# Patient Record
Sex: FEMALE | Race: WHITE | Employment: UNEMPLOYED | URBAN - METROPOLITAN AREA
[De-identification: names, ages, dates, MRNs, and addresses within clinical notes are randomized per-mention and may not be internally consistent; named-entity substitution may affect disease eponyms.]

---

## 2017-11-29 ENCOUNTER — OFFICE VISIT (OUTPATIENT)
Dept: NEUROLOGY | Facility: CLINIC | Age: 67
End: 2017-11-29
Payer: COMMERCIAL

## 2017-11-29 VITALS
WEIGHT: 110.5 LBS | BODY MASS INDEX: 20.86 KG/M2 | HEART RATE: 66 BPM | SYSTOLIC BLOOD PRESSURE: 188 MMHG | DIASTOLIC BLOOD PRESSURE: 81 MMHG | HEIGHT: 61 IN | OXYGEN SATURATION: 99 %

## 2017-11-29 DIAGNOSIS — G70.00 MYASTHENIA GRAVIS, ACHR ANTIBODY POSITIVE (H): ICD-10-CM

## 2017-11-29 PROCEDURE — 99214 OFFICE O/P EST MOD 30 MIN: CPT | Performed by: PSYCHIATRY & NEUROLOGY

## 2017-11-29 RX ORDER — PYRIDOSTIGMINE BROMIDE 60 MG/1
60 TABLET ORAL 3 TIMES DAILY
Qty: 90 TABLET | Refills: 11 | Status: SHIPPED | OUTPATIENT
Start: 2017-11-29 | End: 2018-10-18

## 2017-11-29 ASSESSMENT — PAIN SCALES - GENERAL: PAINLEVEL: NO PAIN (0)

## 2017-11-29 NOTE — LETTER
11/29/2017        RE: Zabrina Schmidt  [address] [facility] [address]        NEUROLOGY SPECIALTY CLINIC       HISTORY OF PRESENT ILLNESS:  The patient Zabrina Schmidt is seen for routine follow-up of myasthenia gravis.  She has had mostly ocular symptoms over time.  She did have positive acetylcholine receptor antibody testing.      The patient continues on Mestinon alone, and this adequately controls her symptoms.  Later in the day or if she is very tired she may note eyelid ptosis.  She denies diplopia or bulbar symptoms and denies limb weakness.      The patient recently developed a right eye visual disturbance.  She has found on several occasions that if she turns her eyes to the right very quickly she notes a brief bright light flash in the right eye only.      CURRENT MEDICATION:  Pyridostigmine (Mestinon) 60 mg 3 times a day.      PHYSICAL EXAMINATION:   VITAL SIGNS:  Heart rate 66, blood pressure 188/81, rechecked later and was again elevated with a systolic of 183.   NEUROLOGIC:  Both pupils react well.  I do not appreciate abnormalities on funduscopic examination. There is very slight fatiguing ptosis of the right eyelid after sustained up-gaze.  She has full extraocular motility.  Facial strength is normal.  Jaw strength is normal.  Speech is clear.  Limb strength is normal.      IMPRESSION:   1.  Myasthenia gravis, stable.   2.  Right eye visual disturbance.   3.  Hypertension.      PLAN:     1.  I explained to the patient that the transient right eye visual disturbance is not a manifestation of myasthenia gravis, and I have recommended she see an Eye Specialist to make certain there are no retinal abnormalities.  She plans to schedule an appointment near home.   2.  I asked her to follow up with Jonn Dennies concerning her elevated blood pressure.   3.  She will continue on Mestinon 60 mg 3 times a day.   4.  I will see the patient back in 1 year,         BRIONNA MARTÍNEZ MD              D: 2017 11:43   T: 2017 16:23   MT: EM#[id number]      Name:     MARU MENDOZA   MRN:      [id number]        Account:      [id number]   :      1950           Visit Date:   2017      Document: [id number]       cc: Jonnie Nolan PA-C         Sincerely,        Jose Smith MD

## 2017-11-29 NOTE — PROGRESS NOTES
NEUROLOGY SPECIALTY CLINIC       HISTORY OF PRESENT ILLNESS:  The patient Zabrina Schmidt is seen for routine follow-up of myasthenia gravis.  She has had mostly ocular symptoms over time.  She did have positive acetylcholine receptor antibody testing.      The patient continues on Mestinon alone, and this adequately controls her symptoms.  Later in the day or if she is very tired she may note eyelid ptosis.  She denies diplopia or bulbar symptoms and denies limb weakness.      The patient recently developed a right eye visual disturbance.  She has found on several occasions that if she turns her eyes to the right very quickly she notes a brief bright light flash in the right eye only.      CURRENT MEDICATION:  Pyridostigmine (Mestinon) 60 mg 3 times a day.      PHYSICAL EXAMINATION:   VITAL SIGNS:  Heart rate 66, blood pressure 188/81, rechecked later and was again elevated with a systolic of 183.   NEUROLOGIC:  Both pupils react well.  I do not appreciate abnormalities on funduscopic examination. There is very slight fatiguing ptosis of the right eyelid after sustained up-gaze.  She has full extraocular motility.  Facial strength is normal.  Jaw strength is normal.  Speech is clear.  Limb strength is normal.      IMPRESSION:   1.  Myasthenia gravis, stable.   2.  Right eye visual disturbance.   3.  Hypertension.      PLAN:     1.  I explained to the patient that the transient right eye visual disturbance is not a manifestation of myasthenia gravis, and I have recommended she see an Eye Specialist to make certain there are no retinal abnormalities.  She plans to schedule an appointment near home.   2.  I asked her to follow up with Jonn Dennies concerning her elevated blood pressure.   3.  She will continue on Mestinon 60 mg 3 times a day.   4.  I will see the patient back in 1 year,         BRIONNA MARTÍNEZ MD             D: 11/29/2017 11:43   T: 11/29/2017 16:23   MT: EM#[id number]      Name:     ZABRINA SCHMIDT   MRN:       [id number]        Account:      [id number]   :      1950           Visit Date:   2017      Document: [id number]       cc: Jonnie Nolan PA-C

## 2017-11-29 NOTE — MR AVS SNAPSHOT
After Visit Summary   11/29/2017    Zabrina Schmidt    MRN: [id number]           Patient Information     Date Of Birth          1950        Visit Information        Provider Department      11/29/2017 11:30 AM Jose Smith MD [facility]        Today's Diagnoses     Myasthenia gravis, AChR antibody positive (H)          Care Instructions    Follow up with your Primary regarding your blood pressure          Follow-ups after your visit        Follow-up notes from your care team     Discussed this visit Return in about 11 months (around 10/29/2018).      Your next 10 appointments already scheduled     Oct 18, 2018  1:00 PM CDT   Return Visit with Jose Smith MD   [facility] ([facility])    [address] [place] [id number] [phone]              Who to contact     If you have questions or need follow up information about today's clinic visit or your schedule please contact [facility] directly at [phone].  Normal or non-critical lab and imaging results will be communicated to you by Sherpaahart, letter or phone within 4 business days after the clinic has received the results. If you do not hear from us within 7 days, please contact the clinic through Harimatat or phone. If you have a critical or abnormal lab result, we will notify you by phone as soon as possible.  Submit refill requests through [employer] or call your pharmacy and they will forward the refill request to us. Please allow 3 business days for your refill to be completed.          Additional Information About Your Visit        Sherpaahart Information     [employer] is an electronic gateway that provides easy, online access to your medical records. With [employer], you can request a clinic appointment, read your test results, renew a prescription or communicate with your care team.     To sign up for [employer] visit the website at  "[email or web address]   You will be asked to enter the access code listed below, as well as some personal information. Please follow the directions to create your username and password.     Your access code is: [email or web address]  Expires: 2018 11:34 AM     Your access code will  in 90 days. If you need help or a new code, please contact your [facility] or call [phone] for assistance.        Care EveryWhere ID     This is your Care EveryWhere ID. This could be used by other organizations to access your [place] medical records  [id number]        Your Vitals Were     Pulse Height Pulse Oximetry BMI (Body Mass Index)          66 1.549 m (5' 1\") 99% 20.88 kg/m2         Blood Pressure from Last 3 Encounters:   17 188/81   16 146/74   16 130/72    Weight from Last 3 Encounters:   17 50.1 kg (110 lb 8 oz)   01/13/15 50.8 kg (112 lb)              Today, you had the following     No orders found for display         Where to get your medicines      These medications were sent to [facility] [address] [address]     Phone:  [phone]     pyridostigmine 60 MG tablet          Primary Care Provider Office Phone # Fax #    ELIAN [phone]       [place] [address]        Equal Access to Services     [facility] TATIANA AH: Hadii aad ku hadasho Soomaali, waaxda luqadaha, qaybta kaalmada adeegyada, fabiola velasco. So [facility] [phone].    ATENCIÓN: Si habla español, tiene a yanes disposición servicios gratuitos de asistencia lingüística. Llame al [phone].    We comply with applicable federal civil rights laws and [place] laws. We do not discriminate on the basis of race, color, national origin, age, disability, sex, sexual orientation, or gender identity.            Thank you!     Thank you for choosing [facility]  for your care. Our goal is always to provide you with excellent care. Hearing back from our patients is one way we can continue to improve our services. Please take a few minutes to complete the written survey that you may receive in the mail after your visit with us. Thank you!             Your Updated Medication List - Protect others around you: Learn how to safely use, store and throw away your medicines at www.disposemymeds.org.          This list is accurate as of: 11/29/17 11:40 AM.  Always use your most recent med list.                   Brand Name Dispense Instructions for use Diagnosis    IBUPROFEN PO      Take by mouth as needed for moderate pain (Dose unknown)        pyridostigmine 60 MG tablet    MESTINON    90 tablet    Take 1 tablet (60 mg) by mouth 3 times daily    Myasthenia gravis, AChR antibody positive (H)

## 2017-11-29 NOTE — NURSING NOTE
"Zabrina Schmidt's goals for this visit include: return  She requests these members of her care team be copied on today's visit information:     PCP: ELIAN RAY    Referring Provider:  No referring provider defined for this encounter.    Chief Complaint   Patient presents with     RECHECK       Initial /81  Pulse 66  Ht 1.549 m (5' 1\")  Wt 50.1 kg (110 lb 8 oz)  SpO2 99%  BMI 20.88 kg/m2 Estimated body mass index is 20.88 kg/(m^2) as calculated from the following:    Height as of this encounter: 1.549 m (5' 1\").    Weight as of this encounter: 50.1 kg (110 lb 8 oz).  Medication Reconciliation: complete    Do you need any medication refills at today's visit? y  "

## 2018-10-18 ENCOUNTER — OFFICE VISIT (OUTPATIENT)
Dept: NEUROLOGY | Facility: CLINIC | Age: 68
End: 2018-10-18
Payer: COMMERCIAL

## 2018-10-18 VITALS
HEART RATE: 67 BPM | WEIGHT: 112 LBS | OXYGEN SATURATION: 99 % | BODY MASS INDEX: 21.16 KG/M2 | DIASTOLIC BLOOD PRESSURE: 79 MMHG | SYSTOLIC BLOOD PRESSURE: 158 MMHG

## 2018-10-18 DIAGNOSIS — G70.00 OCULAR MYASTHENIA GRAVIS (H): ICD-10-CM

## 2018-10-18 DIAGNOSIS — G70.00 MYASTHENIA GRAVIS, ACHR ANTIBODY POSITIVE (H): Primary | ICD-10-CM

## 2018-10-18 PROCEDURE — 99213 OFFICE O/P EST LOW 20 MIN: CPT | Performed by: PSYCHIATRY & NEUROLOGY

## 2018-10-18 RX ORDER — PYRIDOSTIGMINE BROMIDE 60 MG/1
60 TABLET ORAL 3 TIMES DAILY
Qty: 90 TABLET | Refills: 11 | Status: SHIPPED | OUTPATIENT
Start: 2018-10-18 | End: 2019-10-17

## 2018-10-18 ASSESSMENT — PAIN SCALES - GENERAL: PAINLEVEL: NO PAIN (0)

## 2018-10-18 NOTE — MR AVS SNAPSHOT
After Visit Summary   10/18/2018    Zabrina Schmidt    MRN: [id number]           Patient Information     Date Of Birth          1950        Visit Information        Provider Department      10/18/2018 1:00 PM Jose Smith MD [facility]        Today's Diagnoses     Myasthenia gravis, AChR antibody positive (H)    -  1    Ocular myasthenia gravis (H)           Follow-ups after your visit        Follow-up notes from your care team     Discussed this visit Return in about 1 year (around 10/18/2019).      Who to contact     If you have questions or need follow up information about today's clinic visit or your schedule please contact [facility] directly at [phone].  Normal or non-critical lab and imaging results will be communicated to you by MyChart, letter or phone within 4 business days after the clinic has received the results. If you do not hear from us within 7 days, please contact the clinic through MyChart or phone. If you have a critical or abnormal lab result, we will notify you by phone as soon as possible.  Submit refill requests through [employer] or call your pharmacy and they will forward the refill request to us. Please allow 3 business days for your refill to be completed.          Additional Information About Your Visit        MyChart Information     [employer] is an electronic gateway that provides easy, online access to your medical records. With [employer], you can request a clinic appointment, read your test results, renew a prescription or communicate with your care team.     To sign up for [employer] visit the website at [email or web address]   You will be asked to enter the access code listed below, as well as some personal information. Please follow the directions to create your username and password.     Your access code is: [email or web address]  Expires: 2019  1:08 PM     Your access code will  in 90 days. If you need help or a new  code, please contact your [facility] or call [phone] for assistance.        Care EveryWhere ID     This is your Care EveryWhere ID. This could be used by other organizations to access your [place] medical records  [id number]        Your Vitals Were     Pulse Pulse Oximetry BMI (Body Mass Index)             67 99% 21.16 kg/m2          Blood Pressure from Last 3 Encounters:   10/18/18 158/79   11/29/17 188/81   12/01/16 146/74    Weight from Last 3 Encounters:   10/18/18 50.8 kg (112 lb)   11/29/17 50.1 kg (110 lb 8 oz)   01/13/15 50.8 kg (112 lb)              Today, you had the following     No orders found for display         Where to get your medicines      These medications were sent to [facility] [address] [address]     Phone:  [phone]     pyridostigmine 60 MG tablet          Primary Care Provider Office Phone # Fax #    ELIAN [phone]       [address] [address]        Equal Access to Services     RADHA SIMPSON : Hadii aad ku hadasho Sodavidali, waaxda luqadaha, qaybta kaalmada adeegyada, fabiola velasco. So [facility] [phone].    ATENCIÓN: Si habla español, tiene a yanes disposición servicios gratuitos de asistencia lingüística. Delbert al [phone].    We comply with applicable federal civil rights laws and [place] laws. We do not discriminate on the basis of race, color, national origin, age, disability, sex, sexual orientation, or gender identity.            Thank you!     Thank you for choosing [facility]  for your care. Our goal is always to provide you with excellent care. Hearing back from our patients is one way we can continue to improve our services. Please take a few minutes to complete the written survey that you may receive in the mail after your visit with us. Thank you!              Your Updated Medication List - Protect others around you: Learn how to safely use, store and throw away your medicines at www.disposemymeds.org.          This list is accurate as of 10/18/18  1:08 PM.  Always use your most recent med list.                   Brand Name Dispense Instructions for use Diagnosis    IBUPROFEN PO      Take by mouth as needed for moderate pain (Dose unknown)        pyridostigmine 60 MG tablet    MESTINON    90 tablet    Take 1 tablet (60 mg) by mouth 3 times daily    Myasthenia gravis, AChR antibody positive (H)

## 2018-10-18 NOTE — NURSING NOTE
Zabrina Schmidt's goals for this visit include:   Chief Complaint   Patient presents with     RECHECK     Myasthenia gravis       She requests these members of her care team be copied on today's visit information: yes    PCP: ELIAN RAY    Referring Provider:  No referring provider defined for this encounter.    /79 (BP Location: Left arm, Patient Position: Sitting, Cuff Size: Adult Regular)  Pulse 67  Wt 50.8 kg (112 lb)  SpO2 99%  BMI 21.16 kg/m2    Do you need any medication refills at today's visit? No    Mai Chavez LPN

## 2018-10-18 NOTE — LETTER
"    10/18/2018         RE: Zabrina Schmidt  [address] [facility] [address]        Dear Colleague,    Thank you for referring your patient, Zabrina Schmidt, to the [facility]. Please see a copy of my visit note below.    Visit Date:   10/18/2018      2018            Jonnie Nolan PA-C   [address] [address]      Patient:  Zabrina Schmidt   MRN:  [id number]   :  1950      Dear Dr. Nolan:      I saw Zabrina Schmidt for routine followup.  She is a patient with ocular myasthenia gravis.  She had positive acetylcholine receptor antibodies.  I have followed her since  and her symptoms have remained strictly ocular over that interval.      She tells me she is doing \"pretty good.\"  She does indicate for about a week and a half her left eyelid drooped consistently but this has improved.  It tends to be worse when she is out in bright sunlight.      She continues on pyridostigmine 60 mg 3 times a day and does get adequate control of her symptoms with the drug.  She actually wonders about reducing the dose.      I am seeing her today in the afternoon before her pyridostigmine dose.   PHYSICAL EXAMINATION:   VITAL SIGNS:  Heart rate 67.  Blood pressure 158/79.   NEUROLOGIC:  She does have fatiguing ptosis of the left eyelid.  Pupils are equal, round and react well to light.  She has full extraocular motility.  She has normal facial strength and jaw strength.  Speech is clear.  Neck strength and limb strength are normal.      IMPRESSION:  Ocular myasthenia gravis.      PLAN:  I discussed today prednisone if her symptoms become more persistent or problematic.  She is reluctant to take prednisone.  She feels she gets adequate relief with pyridostigmine and actually may try to reduce the dose to 60 mg twice a day, which is fine.  I did tell her this is purely symptomatic treatment.      I did advise her if her symptoms " become more persistent or she develops any bulbar or limb symptoms (which I think is unlikely given the duration of her disease being strictly ocular) that she should follow up with me.  Otherwise, I plan to see her back in a year.         JOSE SMITH MD             D: 10/18/2018   T: 10/18/2018   MT: NIRMAL      Name:     MARU MENDOZA   MRN:      [id number]        Account:      [id number]   :      1950           Visit Date:   10/18/2018      Document: [id number]       cc: Jonnie Nolan PA-C       Again, thank you for allowing me to participate in the care of your patient.        Sincerely,        Jose Smith MD

## 2019-10-17 ENCOUNTER — OFFICE VISIT (OUTPATIENT)
Dept: NEUROLOGY | Facility: CLINIC | Age: 69
End: 2019-10-17
Payer: COMMERCIAL

## 2019-10-17 VITALS
OXYGEN SATURATION: 98 % | DIASTOLIC BLOOD PRESSURE: 87 MMHG | WEIGHT: 112 LBS | BODY MASS INDEX: 21.14 KG/M2 | HEIGHT: 61 IN | RESPIRATION RATE: 16 BRPM | SYSTOLIC BLOOD PRESSURE: 184 MMHG | HEART RATE: 65 BPM

## 2019-10-17 DIAGNOSIS — G70.00 MYASTHENIA GRAVIS, ACHR ANTIBODY POSITIVE (H): ICD-10-CM

## 2019-10-17 PROCEDURE — 99212 OFFICE O/P EST SF 10 MIN: CPT | Performed by: PSYCHIATRY & NEUROLOGY

## 2019-10-17 RX ORDER — PYRIDOSTIGMINE BROMIDE 60 MG/1
60 TABLET ORAL 2 TIMES DAILY
Qty: 60 TABLET | Refills: 11 | Status: SHIPPED | OUTPATIENT
Start: 2019-10-17 | End: 2019-12-04

## 2019-10-17 ASSESSMENT — MIFFLIN-ST. JEOR: SCORE: 965.41

## 2019-10-17 ASSESSMENT — PAIN SCALES - GENERAL: PAINLEVEL: SEVERE PAIN (7)

## 2019-10-17 NOTE — LETTER
10/17/2019         RE: Zabrina Schmidt  [address] [facility] [address]        Dear Colleague,    Thank you for referring your patient, Zabrina Schmidt, to the [facility]. Please see a copy of my visit note below.    Visit Date:   10/17/2019      I saw Zabrina Schmidt back.  This is a yearly followup for ocular myasthenia gravis.  I have followed her since .  Her symptoms have remained strictly ocular.  She did have positive acetylcholine receptor antibodies in the past.      In terms of her myasthenia, she continues on Mestinon 60 mg 3 times a day.  She would like to reduce the dose due to cost.      She does not report any dramatic new problems.  She reports some photosensitivity but not really a lot of ptosis or diplopia.  She has no generalized symptoms.      PHYSICAL EXAMINATION:   VITAL SIGNS:  Heart rate is 66.  Her initial blood pressure is 184/87 with an automatic cuff.  I rechecked it later with a manual cuff and it was 170/80.   EYES:  Pupils are equal, round and react well to light.  She has no ptosis, even with sustained upgaze.  She has full extraocular motility.   NEUROLOGIC:  Facial strength is normal.  Speech is clear.  Neck strength and limb strength are normal.      IMPRESSION:  Ocular myasthenia gravis.      PLAN:  I told her it is fine to try and reduce her Mestinon dose to 60 mg twice a day as tolerated.      Her blood pressure is elevated today, and I have asked her to followup with Primary provider concerning it.  I did give her a sheet with blood pressures from today that she can take to visit.      I will continue to see her back, at least yearly.            BRIONNA MARTÍNEZ MD             D: 10/17/2019   T: 10/17/2019   MT: ELENI      Name:     ZABRINA SCHMIDT   MRN:      [id number]        Account:      [id number]   :      1950           Visit Date:   10/17/2019      Document: [id number]       cc: Jonnie Nolan PA-C       Again, thank you for  allowing me to participate in the care of your patient.        Sincerely,        Jose Smith MD

## 2019-10-17 NOTE — PROGRESS NOTES
Visit Date:   10/17/2019      I saw Zabrina Schmidt back.  This is a yearly followup for ocular myasthenia gravis.  I have followed her since .  Her symptoms have remained strictly ocular.  She did have positive acetylcholine receptor antibodies in the past.      In terms of her myasthenia, she continues on Mestinon 60 mg 3 times a day.  She would like to reduce the dose due to cost.      She does not report any dramatic new problems.  She reports some photosensitivity but not really a lot of ptosis or diplopia.  She has no generalized symptoms.      PHYSICAL EXAMINATION:   VITAL SIGNS:  Heart rate is 66.  Her initial blood pressure is 184/87 with an automatic cuff.  I rechecked it later with a manual cuff and it was 170/80.   EYES:  Pupils are equal, round and react well to light.  She has no ptosis, even with sustained upgaze.  She has full extraocular motility.   NEUROLOGIC:  Facial strength is normal.  Speech is clear.  Neck strength and limb strength are normal.      IMPRESSION:  Ocular myasthenia gravis.      PLAN:  I told her it is fine to try and reduce her Mestinon dose to 60 mg twice a day as tolerated.      Her blood pressure is elevated today, and I have asked her to followup with Primary provider concerning it.  I did give her a sheet with blood pressures from today that she can take to visit.      I will continue to see her back, at least yearly.            BRIONNA MARTÍNEZ MD             D: 10/17/2019   T: 10/17/2019   MT: ELENI      Name:     ZABRINA SCHMIDT   MRN:      [id number]        Account:      [id number]   :      1950           Visit Date:   10/17/2019      Document: [id number]       cc: Jonnie Nolan PA-C

## 2019-10-17 NOTE — NURSING NOTE
"Zabrinaling Schmidt's goals for this visit include: Return  She requests these members of her care team be copied on today's visit information: PCP    PCP: ELIAN RAY    Referring Provider:  No referring provider defined for this encounter.    BP (!) 184/87 (BP Location: Right arm, Patient Position: Sitting, Cuff Size: Adult Small)   Pulse 65   Resp 16   Ht 1.549 m (5' 1\")   Wt 50.8 kg (112 lb)   SpO2 98%   BMI 21.16 kg/m      Do you need any medication refills at today's visit? N    "

## 2019-12-04 ENCOUNTER — TELEPHONE (OUTPATIENT)
Dept: NEUROLOGY | Facility: CLINIC | Age: 69
End: 2019-12-04

## 2019-12-04 DIAGNOSIS — G70.00 MYASTHENIA GRAVIS, ACHR ANTIBODY POSITIVE (H): ICD-10-CM

## 2019-12-04 RX ORDER — PYRIDOSTIGMINE BROMIDE 60 MG/1
60 TABLET ORAL 2 TIMES DAILY
Qty: 180 TABLET | Refills: 3 | Status: SHIPPED | OUTPATIENT
Start: 2019-12-04 | End: 2020-10-21

## 2019-12-04 NOTE — TELEPHONE ENCOUNTER
Health Call Center    Phone Message    May a detailed message be left on voicemail: yes    Reason for Call: Medication Question or concern regarding medication   Prescription Clarification  Name of Medication: pyridostigmine (MESTINON) 60 MG tablet [27175] (Order [id number])  Prescribing Provider: Dr. Smith   Pharmacy: [facility]   What on the order needs clarification? Pt states she is switching to [facility] and this Rx will be going through their pharmacy. She was advised by [facility] to notify care team that she will be receiving this medication from them and she should receive it within 1-2 days. No response necessary unless care team has questions for patient.    Action Taken: Message routed to:  Adult Clinics: Neurology p [id number]

## 2019-12-20 NOTE — TELEPHONE ENCOUNTER
Patient called regarding prescription has not been received by [facility]. Prescription can be faxed to [phone]  Please advise

## 2019-12-20 NOTE — TELEPHONE ENCOUNTER
Called [facility] pharmacy to clarify pyridostigmine prescription. Per pharmacy technician, they received the prescription on 12/4/19, but discontinued it due to the patient not having prescription benefits at [facility]. Writer to call patient and clarify.    Autumn Ha RN, BSN  [facility]

## 2019-12-20 NOTE — TELEPHONE ENCOUNTER
Called and spoke with patient regarding pyridostigmine prescription. Discussed conversation writer had with [facility] pharmacy. See previous note in this encounter.     Patient states that [facility] sent her two letters this month. The first one stated that the pyridostigmine was not covered under her insurance. Patient reports that the second letter asked patient to call clinic to resend prescription. Reiterated to patient that [facility] stated she did not have pharmacy coverage there. Advised patient call [facility] to determine next steps. Told patient that we would be happy to resend prescription if needed. Patient was agreeable and will call back with an update.    Autumn Ha RN, BSN  [facility]

## 2019-12-30 NOTE — TELEPHONE ENCOUNTER
I called patient to follow up and be sure nothing else was needed form us. Pt states she got everything straightened out and needs to re-apply for assistance.  I am closing encounter and pt will call if she needs further assistance    Andreia Amos LPN

## 2020-10-21 ENCOUNTER — OFFICE VISIT (OUTPATIENT)
Dept: NEUROLOGY | Facility: CLINIC | Age: 70
End: 2020-10-21
Payer: COMMERCIAL

## 2020-10-21 VITALS
SYSTOLIC BLOOD PRESSURE: 176 MMHG | WEIGHT: 113.9 LBS | BODY MASS INDEX: 21.5 KG/M2 | HEIGHT: 61 IN | HEART RATE: 72 BPM | DIASTOLIC BLOOD PRESSURE: 69 MMHG | OXYGEN SATURATION: 97 %

## 2020-10-21 DIAGNOSIS — R03.0 ELEVATED BLOOD PRESSURE READING: Primary | ICD-10-CM

## 2020-10-21 DIAGNOSIS — G70.00 MYASTHENIA GRAVIS, ACHR ANTIBODY POSITIVE (H): ICD-10-CM

## 2020-10-21 PROCEDURE — 99213 OFFICE O/P EST LOW 20 MIN: CPT | Performed by: PSYCHIATRY & NEUROLOGY

## 2020-10-21 RX ORDER — PYRIDOSTIGMINE BROMIDE 60 MG/1
60 TABLET ORAL 2 TIMES DAILY
Qty: 180 TABLET | Refills: 3 | Status: SHIPPED | OUTPATIENT
Start: 2020-10-21 | End: 2021-10-19

## 2020-10-21 RX ORDER — LISINOPRIL 10 MG/1
10 TABLET ORAL DAILY
COMMUNITY

## 2020-10-21 ASSESSMENT — MIFFLIN-ST. JEOR: SCORE: 969.03

## 2020-10-21 ASSESSMENT — PAIN SCALES - GENERAL: PAINLEVEL: NO PAIN (0)

## 2020-10-21 NOTE — LETTER
10/21/2020       RE: Zabrina Schmidt  [address] [facility] [address]     Dear Colleague,    Thank you for referring your patient, Zabrina Schmidt, to the [facility] [place] [facility]. Please see a copy of my visit note below.    Visit Date:   10/21/2020      Zabrina Schmidt returns for routine followup of antibody positive ocular myasthenia.  She is a patient I have been following since .  Her symptoms remain very well controlled on Mestinon.  She is currently taking 60 mg twice a day.  She does find if she misses a dose, her left eyelid will droop.  She has not had any diplopia over the past year.  She denies any bulbar symptoms.  She denies any limb weakness.      She did establish care with Jonnie Nolan PA-C at the [facility] and is now on lisinopril for blood pressure.      PHYSICAL EXAMINATION:   VITAL SIGNS:  Reveals her heart rate is 72.  Blood pressure 176/69.   She has full extraocular motility.  There is no ptosis even with sustained upgaze.  Pupils are equal, round and react well to light.  Facial strength is normal.  Speech is clear.  Neck strength is normal.  Limb strength is normal.      IMPRESSION:  Antibody positive ocular myasthenia gravis -- stable.      PLAN:  She will continue on Mestinon 60 mg twice a day.  She tolerates the drug well.      Her blood pressure is elevated today and I recommended she follow up with her primary care provider, Jonnie Nolan.      I did tell Zabrina HO will be retiring after 2021.  I did introduce her to Dr. Morales, who is also a neurologist working at the [facility], and she will likely be following up with him in a year.         BRIONNA MARTÍNEZ MD             D: 10/21/2020   T: 10/21/2020   MT: CORWIN      Name:     ZABRINA SCHMIDT   MRN:      [id number]        Account:      [id number]   :      1950           Visit Date:   10/21/2020      Document:  [id number]       cc: Jonnie Nolan PA-C         Again, thank you for allowing me to participate in the care of your patient.      Sincerely,    Jose Smith MD

## 2020-10-21 NOTE — PROGRESS NOTES
Visit Date:   10/21/2020      Zabrina Schmidt returns for routine followup of antibody positive ocular myasthenia.  She is a patient I have been following since .  Her symptoms remain very well controlled on Mestinon.  She is currently taking 60 mg twice a day.  She does find if she misses a dose, her left eyelid will droop.  She has not had any diplopia over the past year.  She denies any bulbar symptoms.  She denies any limb weakness.      She did establish care with Jonnie Nolan PA-C at the [facility] and is now on lisinopril for blood pressure.      PHYSICAL EXAMINATION:   VITAL SIGNS:  Reveals her heart rate is 72.  Blood pressure 176/69.   She has full extraocular motility.  There is no ptosis even with sustained upgaze.  Pupils are equal, round and react well to light.  Facial strength is normal.  Speech is clear.  Neck strength is normal.  Limb strength is normal.      IMPRESSION:  Antibody positive ocular myasthenia gravis -- stable.      PLAN:  She will continue on Mestinon 60 mg twice a day.  She tolerates the drug well.      Her blood pressure is elevated today and I recommended she follow up with her primary care provider, Jonnie Nolan.      I did tell Zabrina I will be retiring after 2021.  I did introduce her to Dr. Morales, who is also a neurologist working at the [facility], and she will likely be following up with him in a year.         BRIONNA MARTÍNEZ MD             D: 10/21/2020   T: 10/21/2020   MT: CORWIN      Name:     ZABRINA SCHMIDT   MRN:      [id number]        Account:      [id number]   :      1950           Visit Date:   10/21/2020      Document: [id number]       cc: Jonnie Nolan PA-C

## 2020-12-14 ENCOUNTER — HEALTH MAINTENANCE LETTER (OUTPATIENT)
Age: 70
End: 2020-12-14

## 2021-05-29 ENCOUNTER — RECORDS - HEALTHEAST (OUTPATIENT)
Dept: ADMINISTRATIVE | Facility: CLINIC | Age: 71
End: 2021-05-29

## 2021-10-02 ENCOUNTER — HEALTH MAINTENANCE LETTER (OUTPATIENT)
Age: 71
End: 2021-10-02

## 2021-10-19 ENCOUNTER — TELEPHONE (OUTPATIENT)
Dept: NEUROLOGY | Facility: CLINIC | Age: 71
End: 2021-10-19

## 2021-10-19 DIAGNOSIS — G70.00 MYASTHENIA GRAVIS, ACHR ANTIBODY POSITIVE (H): ICD-10-CM

## 2021-10-19 RX ORDER — PYRIDOSTIGMINE BROMIDE 60 MG/1
60 TABLET ORAL 2 TIMES DAILY
Qty: 180 TABLET | Refills: 3 | Status: SHIPPED | OUTPATIENT
Start: 2021-10-19

## 2021-10-19 NOTE — TELEPHONE ENCOUNTER
[facility] Call Center    Phone Message    May a detailed message be left on voicemail: yes     Reason for Call: Other: Pt unable to be seen today due to insurance issues. She only has 4 days of medication left and wondering if she could get a refill until the insurance issue is taken care of. Any questions, please call.     Action Taken: Message routed to:  Adult Clinics: Neurology p [id number]    Travel Screening: Not Applicable

## 2021-10-19 NOTE — TELEPHONE ENCOUNTER
Patient has [employer] insurance which is not in network with [facility]. Faxed continuity of care form to [facility] to see if she will be able to see Dr. Morales in the future. Fax receipt verified via rightfax.

## 2021-10-20 NOTE — TELEPHONE ENCOUNTER
The pt was informed that Dr Morales sent in refills. She will need to look for another neurologist because Dr Morales does not take [employer] insurance. She was grateful for the prescription and had no further questions.    Sofie Calderón RN   Neurology Care Coordinator

## 2021-10-20 NOTE — TELEPHONE ENCOUNTER
Tried to reach the pt to let her know that Dr Morales sent refills to her pharmacy but her line just kept ringing and no VM picked up so unable to leave a message.  Will try again later.     Sofie Calderón RN   Neurology Care Coordinator

## 2022-01-22 ENCOUNTER — HEALTH MAINTENANCE LETTER (OUTPATIENT)
Age: 72
End: 2022-01-22

## 2022-09-03 ENCOUNTER — HEALTH MAINTENANCE LETTER (OUTPATIENT)
Age: 72
End: 2022-09-03

## 2023-01-15 ENCOUNTER — HEALTH MAINTENANCE LETTER (OUTPATIENT)
Age: 73
End: 2023-01-15

## 2023-04-29 ENCOUNTER — HEALTH MAINTENANCE LETTER (OUTPATIENT)
Age: 73
End: 2023-04-29
